# Patient Record
Sex: FEMALE | URBAN - METROPOLITAN AREA
[De-identification: names, ages, dates, MRNs, and addresses within clinical notes are randomized per-mention and may not be internally consistent; named-entity substitution may affect disease eponyms.]

---

## 2017-10-07 ENCOUNTER — IMPORTED ENCOUNTER (OUTPATIENT)
Dept: URBAN - METROPOLITAN AREA CLINIC 1 | Facility: CLINIC | Age: 67
End: 2017-10-07

## 2017-10-07 PROBLEM — Z79.84: Noted: 2017-10-07

## 2017-10-07 PROBLEM — H25.813: Noted: 2017-10-07

## 2017-10-07 PROBLEM — H04.123: Noted: 2017-10-07

## 2017-10-07 PROBLEM — H16.143: Noted: 2017-10-07

## 2017-10-07 PROBLEM — E11.9: Noted: 2017-10-07

## 2017-10-07 PROCEDURE — 92015 DETERMINE REFRACTIVE STATE: CPT

## 2017-10-07 PROCEDURE — 92014 COMPRE OPH EXAM EST PT 1/>: CPT

## 2017-10-07 NOTE — PATIENT DISCUSSION
1.  DM Type II (Oral Medication) without sign of diabetic retinopathy and no blot heme on dilated retinal examination today OU No Macular Edema:  Discussed the pathophysiology of diabetes and its effect on the eye and risk of blindness. Stressed the importance of strong glucose control. Advised of importance of at least yearly dilated examinations but to contact us immediately for any problems or concerns. 2. ZHANE w/ PEK OU- Recommend ATs BID OU routinely (sample of Systane Balance given) 3. Cataract OU: Observe for now without intervention. The patient was advised to contact us if any change or worsening of visionRefraction fee discussed and pt agrees to non-covered serviceReturn for an appointment in 1 year 27 with Dr. Marilia Can.

## 2017-11-01 NOTE — PATIENT DISCUSSION
Treat with Annalee Baptiste twice a day OU.  Explained that this is a long-term medication with results taking months and that some initial burning or unusual taste may be normal.

## 2022-04-02 ASSESSMENT — KERATOMETRY
OS_AXISANGLE2_DEGREES: 101
OD_K1POWER_DIOPTERS: 44.75
OD_AXISANGLE2_DEGREES: 079
OD_AXISANGLE_DEGREES: 169
OS_K2POWER_DIOPTERS: 43.50
OD_K2POWER_DIOPTERS: 44.25
OS_K1POWER_DIOPTERS: 44.75
OS_AXISANGLE_DEGREES: 011

## 2022-04-02 ASSESSMENT — VISUAL ACUITY
OS_SC: 20/20
OD_GLARE: 20/50
OD_SC: 20/25
OS_GLARE: 20/50

## 2022-04-02 ASSESSMENT — TONOMETRY
OS_IOP_MMHG: 20
OD_IOP_MMHG: 20